# Patient Record
Sex: MALE
[De-identification: names, ages, dates, MRNs, and addresses within clinical notes are randomized per-mention and may not be internally consistent; named-entity substitution may affect disease eponyms.]

---
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
## 2020-08-07 ENCOUNTER — COMMUNICATION - HEALTHEAST (OUTPATIENT)
Dept: FAMILY MEDICINE | Facility: CLINIC | Age: 20
End: 2020-08-07

## 2021-06-10 NOTE — TELEPHONE ENCOUNTER
I would recommend that he be scheduled with an alternate provider.  As a float physician I don't follow patients for their long-term care (so wouldn't be a good option for patients looking to establish care).   Thank you.   Sai Duenas MD

## 2021-06-10 NOTE — TELEPHONE ENCOUNTER
Who is calling:  Pt's MotherLidya  Reason for Call:  Pt's Mother states that Pt does not have an established PCP and is too old to continue seeing his Pediatrician (at [facility], [place] -[phone]). As a child, Pt was diagnosed with ADHD many years ago and has been taking Adderall, however, he's out of this medication and unable to get anymore until he sees a new Provider. Pt's Mother states he should also be evaluated for depression and anxiety. This writer scheduled an appt for Richie on Thu 8/13/20 at 8:20am as ADHD INIT, however, am not certain if this is the correct Visit Type?  Please advise if this would be the appropriate route for this patient to take - thank you.  Date of last appointment with primary care: new patient  Okay to leave a detailed message: Yes

## 2025-07-04 ENCOUNTER — HOSPITAL ENCOUNTER (EMERGENCY)
Facility: HOSPITAL | Age: 25
Discharge: HOME | End: 2025-07-04
Attending: EMERGENCY MEDICINE | Admitting: EMERGENCY MEDICINE
Payer: COMMERCIAL

## 2025-07-04 ENCOUNTER — APPOINTMENT (OUTPATIENT)
Dept: CT IMAGING | Facility: HOSPITAL | Age: 25
End: 2025-07-04
Attending: EMERGENCY MEDICINE
Payer: COMMERCIAL

## 2025-07-04 VITALS
WEIGHT: 171 LBS | HEIGHT: 71 IN | DIASTOLIC BLOOD PRESSURE: 82 MMHG | HEART RATE: 63 BPM | OXYGEN SATURATION: 98 % | BODY MASS INDEX: 23.94 KG/M2 | TEMPERATURE: 97.1 F | RESPIRATION RATE: 18 BRPM | SYSTOLIC BLOOD PRESSURE: 132 MMHG

## 2025-07-04 DIAGNOSIS — J03.90 TONSILLITIS: ICD-10-CM

## 2025-07-04 LAB
ANION GAP SERPL CALCULATED.3IONS-SCNC: 13 MMOL/L (ref 7–15)
BASOPHILS # BLD AUTO: 0 10E3/UL (ref 0–0.2)
BASOPHILS NFR BLD AUTO: 0 %
BUN SERPL-MCNC: 9.6 MG/DL (ref 6–20)
CALCIUM SERPL-MCNC: 9.3 MG/DL (ref 8.8–10.4)
CHLORIDE SERPL-SCNC: 102 MMOL/L (ref 98–107)
CREAT SERPL-MCNC: 0.8 MG/DL (ref 0.67–1.17)
EGFRCR SERPLBLD CKD-EPI 2021: >90 ML/MIN/1.73M2
EOSINOPHIL # BLD AUTO: 0 10E3/UL (ref 0–0.7)
EOSINOPHIL NFR BLD AUTO: 0 %
ERYTHROCYTE [DISTWIDTH] IN BLOOD BY AUTOMATED COUNT: 11.4 % (ref 10–15)
GLUCOSE SERPL-MCNC: 109 MG/DL (ref 70–99)
HCO3 SERPL-SCNC: 23 MMOL/L (ref 22–29)
HCT VFR BLD AUTO: 44.3 % (ref 40–53)
HGB BLD-MCNC: 15.1 G/DL (ref 13.3–17.7)
IMM GRANULOCYTES # BLD: 0 10E3/UL
IMM GRANULOCYTES NFR BLD: 0 %
LYMPHOCYTES # BLD AUTO: 0.7 10E3/UL (ref 0.8–5.3)
LYMPHOCYTES NFR BLD AUTO: 14 %
MCH RBC QN AUTO: 30 PG (ref 26.5–33)
MCHC RBC AUTO-ENTMCNC: 34.1 G/DL (ref 31.5–36.5)
MCV RBC AUTO: 88 FL (ref 78–100)
MONOCYTES # BLD AUTO: 0.8 10E3/UL (ref 0–1.3)
MONOCYTES NFR BLD AUTO: 15 %
MONOCYTES NFR BLD AUTO: NEGATIVE %
NEUTROPHILS # BLD AUTO: 3.7 10E3/UL (ref 1.6–8.3)
NEUTROPHILS NFR BLD AUTO: 71 %
NRBC # BLD AUTO: 0 10E3/UL
NRBC BLD AUTO-RTO: 0 /100
PLATELET # BLD AUTO: 284 10E3/UL (ref 150–450)
POTASSIUM SERPL-SCNC: 4.2 MMOL/L (ref 3.4–5.3)
RBC # BLD AUTO: 5.04 10E6/UL (ref 4.4–5.9)
S PYO DNA THROAT QL NAA+PROBE: NOT DETECTED
SODIUM SERPL-SCNC: 138 MMOL/L (ref 135–145)
WBC # BLD AUTO: 5.2 10E3/UL (ref 4–11)

## 2025-07-04 PROCEDURE — 96376 TX/PRO/DX INJ SAME DRUG ADON: CPT | Mod: 59 | Performed by: EMERGENCY MEDICINE

## 2025-07-04 PROCEDURE — 85014 HEMATOCRIT: CPT | Performed by: EMERGENCY MEDICINE

## 2025-07-04 PROCEDURE — 258N000003 HC RX IP 258 OP 636: Performed by: EMERGENCY MEDICINE

## 2025-07-04 PROCEDURE — 96361 HYDRATE IV INFUSION ADD-ON: CPT | Performed by: EMERGENCY MEDICINE

## 2025-07-04 PROCEDURE — 87651 STREP A DNA AMP PROBE: CPT | Performed by: EMERGENCY MEDICINE

## 2025-07-04 PROCEDURE — 250N000011 HC RX IP 250 OP 636: Performed by: EMERGENCY MEDICINE

## 2025-07-04 PROCEDURE — 96375 TX/PRO/DX INJ NEW DRUG ADDON: CPT | Mod: 59 | Performed by: EMERGENCY MEDICINE

## 2025-07-04 PROCEDURE — 36415 COLL VENOUS BLD VENIPUNCTURE: CPT | Performed by: EMERGENCY MEDICINE

## 2025-07-04 PROCEDURE — 96374 THER/PROPH/DIAG INJ IV PUSH: CPT | Mod: 59 | Performed by: EMERGENCY MEDICINE

## 2025-07-04 PROCEDURE — 99285 EMERGENCY DEPT VISIT HI MDM: CPT | Mod: 25 | Performed by: EMERGENCY MEDICINE

## 2025-07-04 PROCEDURE — 70491 CT SOFT TISSUE NECK W/DYE: CPT

## 2025-07-04 PROCEDURE — 80048 BASIC METABOLIC PNL TOTAL CA: CPT | Performed by: EMERGENCY MEDICINE

## 2025-07-04 PROCEDURE — 86308 HETEROPHILE ANTIBODY SCREEN: CPT | Performed by: EMERGENCY MEDICINE

## 2025-07-04 RX ORDER — PREDNISONE 20 MG/1
TABLET ORAL
Qty: 10 TABLET | Refills: 0 | Status: SHIPPED | OUTPATIENT
Start: 2025-07-04

## 2025-07-04 RX ORDER — IOPAMIDOL 755 MG/ML
90 INJECTION, SOLUTION INTRAVASCULAR ONCE
Status: COMPLETED | OUTPATIENT
Start: 2025-07-04 | End: 2025-07-04

## 2025-07-04 RX ORDER — ONDANSETRON 2 MG/ML
4 INJECTION INTRAMUSCULAR; INTRAVENOUS ONCE
Status: COMPLETED | OUTPATIENT
Start: 2025-07-04 | End: 2025-07-04

## 2025-07-04 RX ORDER — MORPHINE SULFATE 4 MG/ML
4 INJECTION, SOLUTION INTRAMUSCULAR; INTRAVENOUS EVERY 30 MIN PRN
Refills: 0 | Status: DISCONTINUED | OUTPATIENT
Start: 2025-07-04 | End: 2025-07-04 | Stop reason: HOSPADM

## 2025-07-04 RX ORDER — DEXAMETHASONE SODIUM PHOSPHATE 4 MG/ML
8 INJECTION, SOLUTION INTRA-ARTICULAR; INTRALESIONAL; INTRAMUSCULAR; INTRAVENOUS; SOFT TISSUE ONCE
Status: COMPLETED | OUTPATIENT
Start: 2025-07-04 | End: 2025-07-04

## 2025-07-04 RX ORDER — OXYCODONE HYDROCHLORIDE 5 MG/1
5 TABLET ORAL EVERY 6 HOURS PRN
Qty: 12 TABLET | Refills: 0 | Status: SHIPPED | OUTPATIENT
Start: 2025-07-04 | End: 2025-07-07

## 2025-07-04 RX ORDER — CLINDAMYCIN HYDROCHLORIDE 300 MG/1
300 CAPSULE ORAL 4 TIMES DAILY
Qty: 40 CAPSULE | Refills: 0 | Status: SHIPPED | OUTPATIENT
Start: 2025-07-04 | End: 2025-07-14

## 2025-07-04 RX ADMIN — DEXAMETHASONE SODIUM PHOSPHATE 8 MG: 4 INJECTION, SOLUTION INTRA-ARTICULAR; INTRALESIONAL; INTRAMUSCULAR; INTRAVENOUS; SOFT TISSUE at 18:12

## 2025-07-04 RX ADMIN — ONDANSETRON 4 MG: 2 INJECTION, SOLUTION INTRAMUSCULAR; INTRAVENOUS at 16:37

## 2025-07-04 RX ADMIN — SODIUM CHLORIDE 1000 ML: 9 INJECTION, SOLUTION INTRAVENOUS at 16:24

## 2025-07-04 RX ADMIN — MORPHINE SULFATE 4 MG: 4 INJECTION, SOLUTION INTRAMUSCULAR; INTRAVENOUS at 18:07

## 2025-07-04 RX ADMIN — MORPHINE SULFATE 4 MG: 4 INJECTION, SOLUTION INTRAMUSCULAR; INTRAVENOUS at 16:40

## 2025-07-04 RX ADMIN — IOPAMIDOL 90 ML: 755 INJECTION, SOLUTION INTRAVENOUS at 17:49

## 2025-07-04 ASSESSMENT — ACTIVITIES OF DAILY LIVING (ADL)
ADLS_ACUITY_SCORE: 41

## 2025-07-04 ASSESSMENT — COLUMBIA-SUICIDE SEVERITY RATING SCALE - C-SSRS
6. HAVE YOU EVER DONE ANYTHING, STARTED TO DO ANYTHING, OR PREPARED TO DO ANYTHING TO END YOUR LIFE?: NO
2. HAVE YOU ACTUALLY HAD ANY THOUGHTS OF KILLING YOURSELF IN THE PAST MONTH?: NO
1. IN THE PAST MONTH, HAVE YOU WISHED YOU WERE DEAD OR WISHED YOU COULD GO TO SLEEP AND NOT WAKE UP?: NO

## 2025-07-04 NOTE — ED PROVIDER NOTES
EMERGENCY DEPARTMENT ENCOUNTER      NAME: Antolin Reyes  AGE: 25 year old male  YOB: 2000  MRN: [id number]  EVALUATION DATE & TIME: 2025  3:42 PM    PCP: Kita Funez    ED PROVIDER: Jesus Barnes M.D.      Chief Complaint   Patient presents with    Pharyngitis         FINAL IMPRESSION:  1.  Acute sore throat.  2.  Acute tonsillitis.      ED COURSE & MEDICAL DECISION MAKIN PM.  I met with the patient to gather history and to perform my initial exam. We discussed plans for the ED course, including diagnostic testing and treatment. PPE worn: cloth mask.  Patient with 5 days of symptoms beginning on Monday with fever, sore throat, pain with swallowing, swollen tonsils, right greater than left.  6:10 PM I rechecked on patient and updated them.  Strep test and mono testing negative.  CBC and chemistries negative.  CT soft tissue neck showing tonsillitis with no evidence of airway obstruction, abscess, peritonsillar abscess.  I did order Decadron and further dose of morphine at this time.  I will recheck the patient to assess pain levels.  Currently down to an 8 out of 10 from 9 out of 1:10 dose of morphine.  7:06 PM.  Patient now notes significant decrease in pain level and feels he can go home.  Patient discharged on prednisone, oxycodone, clindamycin.  Patient in agreement.  Patient given referral to ENT.    Pertinent Labs & Imaging studies reviewed. (See chart for details)  25 year old male presents to the Emergency Department for evaluation of sore throat.    At the conclusion of the encounter I discussed the results of all of the tests and the disposition. The questions were answered. The patient or family acknowledged understanding and was agreeable with the care plan.              Medical Decision Making  History reviewed with the patient.  Urgent care visit reviewed.  Computer inpatient outpatient records reviewed.    Anticipate discharge home after evaluation.    [facility] (CTPE,  Dental pain, Mercedes, Sinusitis, Asthma/COPD, Head Trauma): Not Applicable    SEPSIS: None          MEDICATIONS GIVEN IN THE EMERGENCY:  Medications   morphine (PF) injection 4 mg (4 mg Intravenous $Given 7/4/25 1807)   dexAMETHasone (DECADRON) injection 8 mg (has no administration in time range)   sodium chloride 0.9% BOLUS 1,000 mL (1,000 mLs Intravenous $New Bag 7/4/25 1624)   ondansetron (ZOFRAN) injection 4 mg (4 mg Intravenous $Given 7/4/25 1637)   iopamidol (ISOVUE-370) solution 90 mL (90 mLs Intravenous $Given 7/4/25 1749)       NEW PRESCRIPTIONS STARTED AT TODAY'S ER VISIT  New Prescriptions    No medications on file          =================================================================    HPI    Patient information was obtained from: patient    Use of : N/A      Antolin Reyes is a 25 year old male who presents to this ED by private vehicle for evaluation of pharyngitis.  Patient has been dealing with right-sided throat pain since Sunday night after running a 5k. He was seen previously for throat pain and additional symptoms of body aches, diarrhea, and fever, however these symptoms have resolved over the last 2 days. He states that taking Tylenol/Ibuprofen at home has not helped much with his remaining throat pain. He reports decreased PO intake due to throat pain. Denies alcohol, however endorses using a vape (~50 hits/day). Girlfriend endorses occasional marijuana use.   He does not identify any waxing or waning symptoms otherwise, exacerbating or alleviating features, associated symptoms except as mentioned.     As per triage note, [facility], was swabbed for strep, covid, flu, all were negative. Had been running a fever for 3 days starting Monday. Last dose Tylenol at noon, ibuprofen at 1400. States it is painful to swallow, and is affecting his sleep. States tonsils are swollen and is worse on the right side.     REVIEW OF SYSTEMS   Review of Systems  "sore throat, swelling, pain with swallowing.    PAST MEDICAL HISTORY:  No past medical history on file.    PAST SURGICAL HISTORY:  No past surgical history on file.        CURRENT MEDICATIONS:    ADDERALL XR# 20 MG OR CP24  METHYLIN 10 MG OR TABS  VERMOX 100 MG OR CHEW        ALLERGIES:  No Known Allergies    FAMILY HISTORY:  No family history on file.    SOCIAL HISTORY:   Social History     Socioeconomic History    Marital status: Single   Tobacco Use    Smoking status: Never   Does not smoke tobacco Vapes daily.  No alcohol.  Occasional marijuana use.    VITALS:  /72   Pulse 69   Temp 97.1  F (36.2  C) (Temporal)   Resp 18   Ht 1.803 m (5' 11\")   Wt 77.6 kg (171 lb)   SpO2 98%   BMI 23.85 kg/m      PHYSICAL EXAM    Vital Signs:  /72   Pulse 69   Temp 97.1  F (36.2  C) (Temporal)   Resp 18   Ht 1.803 m (5' 11\")   Wt 77.6 kg (171 lb)   SpO2 98%   BMI 23.85 kg/m    General:  On entering the room he is in no apparent distress.    Neck:  Neck supple with full range of motion and nontender.    Back:  Back and spine are nontender.  No costovertebral angle tenderness.    HEENT:  Oropharynx with moist mucous membranes.  Tonsils swollen bilaterally.  Right greater than left.  No stridor.  No lymphadenopathy.  Pulmonary:  Chest clear to auscultation without rhonchi rales or wheezing.    Cardiovascular:  Cardiac regular rate and rhythm without murmurs rubs or gallops.    Abdomen:  Abdomen soft nontender.  There is no rebound or guarding.    Muskuloskeletal:  He moves all 4 without any difficulty and has normal neurovascular exams.  Extremities without clubbing, cyanosis, or edema.  Legs and calves are nontender.    Neuro:  He is alert and oriented ×3 and moves all extremities symmetrically.    Psych:  Normal affect.    Skin:  Unremarkable and warm and dry.       LAB:  All pertinent labs reviewed and interpreted.  Labs Ordered and Resulted from Time of ED Arrival to Time of ED Departure   BASIC " METABOLIC PANEL - Abnormal       Result Value    Sodium 138      Potassium 4.2      Chloride 102      Carbon Dioxide (CO2) 23      Anion Gap 13      Urea Nitrogen 9.6      Creatinine 0.80      GFR Estimate >90      Calcium 9.3      Glucose 109 (*)    CBC WITH PLATELETS AND DIFFERENTIAL - Abnormal    WBC Count 5.2      RBC Count 5.04      Hemoglobin 15.1      Hematocrit 44.3      MCV 88      MCH 30.0      MCHC 34.1      RDW 11.4      Platelet Count 284      % Neutrophils 71      % Lymphocytes 14      % Monocytes 15      % Eosinophils 0      % Basophils 0      % Immature Granulocytes 0      NRBCs per 100 WBC 0      Absolute Neutrophils 3.7      Absolute Lymphocytes 0.7 (*)     Absolute Monocytes 0.8      Absolute Eosinophils 0.0      Absolute Basophils 0.0      Absolute Immature Granulocytes 0.0      Absolute NRBCs 0.0     MONONUCLEOSIS SCREEN - Normal    Mononucleosis Screen Negative     GROUP A STREPTOCOCCUS PCR THROAT SWAB - Normal    Group A strep by PCR Not Detected         RADIOLOGY:  Reviewed all pertinent imaging. Please see official radiology report.  Soft tissue neck CT w contrast   Final Result   IMPRESSION:    1.  Acute bilateral palatine tonsillitis. No well-formed fluid collection or evidence of peritonsillar abscess.   2.  Reactive cervical lymphadenopathy.                    EKG:          PROCEDURES:         I, Alicja Handy, am serving as a scribe to document services personally performed by Dr. Barnes based on my observation and the provider's statements to me. I, Jesus Barnes MD attest that Alicja Handy is acting in a scribe capacity, has observed my performance of the services and has documented them in accordance with my direction.    Jesus Barnes M.D.  Emergency Medicine  [facility] EMERGENCY DEPARTMENT  [address] [place] [id number]  [phone]  Dept: [phone]       Jesus Barnes MD  07/04/25 6967       Jesus Barnes,  MD  07/04/25 3664

## 2025-07-04 NOTE — ED TRIAGE NOTES
Patient here with sore throat since Monday. [facility], was swabbed for strep, covid, flu, all were negative. Had been running a fever for 3 days starting Monday. Last dose Tylenol at noon, ibuprofen at 1400. States it is painful to swallow, and is affecting his sleep. States tonsils are swollen and is worse on the right side.     Triage Assessment (Adult)       Row Name 07/04/25 4739          Triage Assessment    Airway WDL WDL        Respiratory WDL    Respiratory WDL WDL        Skin Circulation/Temperature WDL    Skin Circulation/Temperature WDL WDL        Cardiac WDL    Cardiac WDL WDL

## 2025-07-05 NOTE — DISCHARGE INSTRUCTIONS
Call [facility] at [phone].  Make an appointment to see them hopefully this next week.  See your family doctor if worse or problems or concerns.  Prednisone as prescribed.  Clindamycin as prescribed.  Over-the-counter Tylenol or ibuprofen every 6 hours.  Additionally, oxycodone can be taken every 6 hours for stronger pain.  Do not drive with this.  Encourage fluids.